# Patient Record
Sex: MALE | Race: ASIAN | NOT HISPANIC OR LATINO
[De-identification: names, ages, dates, MRNs, and addresses within clinical notes are randomized per-mention and may not be internally consistent; named-entity substitution may affect disease eponyms.]

---

## 2018-04-05 ENCOUNTER — APPOINTMENT (OUTPATIENT)
Dept: UROLOGY | Facility: CLINIC | Age: 58
End: 2018-04-05
Payer: COMMERCIAL

## 2018-04-05 VITALS
SYSTOLIC BLOOD PRESSURE: 126 MMHG | HEIGHT: 68 IN | HEART RATE: 101 BPM | TEMPERATURE: 99 F | OXYGEN SATURATION: 98 % | BODY MASS INDEX: 23.64 KG/M2 | DIASTOLIC BLOOD PRESSURE: 80 MMHG | WEIGHT: 156 LBS

## 2018-04-05 DIAGNOSIS — N50.82 SCROTAL PAIN: ICD-10-CM

## 2018-04-05 DIAGNOSIS — Z87.891 PERSONAL HISTORY OF NICOTINE DEPENDENCE: ICD-10-CM

## 2018-04-05 DIAGNOSIS — R30.0 DYSURIA: ICD-10-CM

## 2018-04-05 DIAGNOSIS — I86.1 SCROTAL VARICES: ICD-10-CM

## 2018-04-05 DIAGNOSIS — N50.3 CYST OF EPIDIDYMIS: ICD-10-CM

## 2018-04-05 DIAGNOSIS — Z78.9 OTHER SPECIFIED HEALTH STATUS: ICD-10-CM

## 2018-04-05 PROCEDURE — 99204 OFFICE O/P NEW MOD 45 MIN: CPT

## 2019-02-20 ENCOUNTER — APPOINTMENT (OUTPATIENT)
Dept: CT IMAGING | Facility: CLINIC | Age: 59
End: 2019-02-20
Payer: COMMERCIAL

## 2019-02-20 ENCOUNTER — TRANSCRIPTION ENCOUNTER (OUTPATIENT)
Age: 59
End: 2019-02-20

## 2019-02-20 ENCOUNTER — OUTPATIENT (OUTPATIENT)
Dept: OUTPATIENT SERVICES | Facility: HOSPITAL | Age: 59
LOS: 1 days | End: 2019-02-20
Payer: COMMERCIAL

## 2019-02-20 DIAGNOSIS — J32.0 CHRONIC MAXILLARY SINUSITIS: ICD-10-CM

## 2019-02-20 PROCEDURE — 70486 CT MAXILLOFACIAL W/O DYE: CPT | Mod: 26

## 2019-02-20 PROCEDURE — 70486 CT MAXILLOFACIAL W/O DYE: CPT

## 2022-02-19 ENCOUNTER — EMERGENCY (EMERGENCY)
Facility: HOSPITAL | Age: 62
LOS: 1 days | Discharge: HOME | End: 2022-02-19
Attending: STUDENT IN AN ORGANIZED HEALTH CARE EDUCATION/TRAINING PROGRAM
Payer: COMMERCIAL

## 2022-02-19 VITALS
OXYGEN SATURATION: 99 % | TEMPERATURE: 95 F | WEIGHT: 138.01 LBS | HEART RATE: 67 BPM | HEIGHT: 68 IN | RESPIRATION RATE: 20 BRPM | SYSTOLIC BLOOD PRESSURE: 92 MMHG | DIASTOLIC BLOOD PRESSURE: 62 MMHG

## 2022-02-19 VITALS
TEMPERATURE: 98 F | SYSTOLIC BLOOD PRESSURE: 105 MMHG | OXYGEN SATURATION: 97 % | RESPIRATION RATE: 19 BRPM | HEART RATE: 66 BPM | DIASTOLIC BLOOD PRESSURE: 68 MMHG

## 2022-02-19 LAB
ALBUMIN SERPL ELPH-MCNC: 4 G/DL — SIGNIFICANT CHANGE UP (ref 3.3–5)
ALP SERPL-CCNC: 78 U/L — SIGNIFICANT CHANGE UP (ref 40–120)
ALT FLD-CCNC: 11 U/L — SIGNIFICANT CHANGE UP (ref 10–45)
ANION GAP SERPL CALC-SCNC: 14 MMOL/L — SIGNIFICANT CHANGE UP (ref 5–17)
APPEARANCE UR: CLEAR — SIGNIFICANT CHANGE UP
APTT BLD: 26.6 SEC — LOW (ref 27.5–35.5)
AST SERPL-CCNC: 11 U/L — SIGNIFICANT CHANGE UP (ref 10–40)
BACTERIA # UR AUTO: 0 — SIGNIFICANT CHANGE UP
BASE EXCESS BLDV CALC-SCNC: -2.9 MMOL/L — LOW (ref -2–2)
BASE EXCESS BLDV CALC-SCNC: -4 MMOL/L — LOW (ref -2–2)
BASOPHILS # BLD AUTO: 0.06 K/UL — SIGNIFICANT CHANGE UP (ref 0–0.2)
BASOPHILS NFR BLD AUTO: 0.3 % — SIGNIFICANT CHANGE UP (ref 0–2)
BILIRUB SERPL-MCNC: 0.4 MG/DL — SIGNIFICANT CHANGE UP (ref 0.2–1.2)
BILIRUB UR-MCNC: NEGATIVE — SIGNIFICANT CHANGE UP
BLD GP AB SCN SERPL QL: NEGATIVE — SIGNIFICANT CHANGE UP
BUN SERPL-MCNC: 18 MG/DL — SIGNIFICANT CHANGE UP (ref 7–23)
CA-I SERPL-SCNC: 1.09 MMOL/L — LOW (ref 1.15–1.33)
CA-I SERPL-SCNC: 1.09 MMOL/L — LOW (ref 1.15–1.33)
CALCIUM SERPL-MCNC: 9.1 MG/DL — SIGNIFICANT CHANGE UP (ref 8.4–10.5)
CHLORIDE BLDV-SCNC: 101 MMOL/L — SIGNIFICANT CHANGE UP (ref 96–108)
CHLORIDE BLDV-SCNC: 103 MMOL/L — SIGNIFICANT CHANGE UP (ref 96–108)
CHLORIDE SERPL-SCNC: 100 MMOL/L — SIGNIFICANT CHANGE UP (ref 96–108)
CO2 BLDV-SCNC: 24 MMOL/L — SIGNIFICANT CHANGE UP (ref 22–26)
CO2 BLDV-SCNC: 25 MMOL/L — SIGNIFICANT CHANGE UP (ref 22–26)
CO2 SERPL-SCNC: 22 MMOL/L — SIGNIFICANT CHANGE UP (ref 22–31)
COLOR SPEC: SIGNIFICANT CHANGE UP
CREAT SERPL-MCNC: 0.88 MG/DL — SIGNIFICANT CHANGE UP (ref 0.5–1.3)
DIFF PNL FLD: NEGATIVE — SIGNIFICANT CHANGE UP
EOSINOPHIL # BLD AUTO: 0.17 K/UL — SIGNIFICANT CHANGE UP (ref 0–0.5)
EOSINOPHIL NFR BLD AUTO: 0.8 % — SIGNIFICANT CHANGE UP (ref 0–6)
EPI CELLS # UR: 0 /HPF — SIGNIFICANT CHANGE UP
GAS PNL BLDV: 132 MMOL/L — LOW (ref 136–145)
GAS PNL BLDV: 132 MMOL/L — LOW (ref 136–145)
GAS PNL BLDV: SIGNIFICANT CHANGE UP
GLUCOSE BLDV-MCNC: 125 MG/DL — HIGH (ref 70–99)
GLUCOSE BLDV-MCNC: 165 MG/DL — HIGH (ref 70–99)
GLUCOSE SERPL-MCNC: 170 MG/DL — HIGH (ref 70–99)
GLUCOSE UR QL: ABNORMAL
HCO3 BLDV-SCNC: 23 MMOL/L — SIGNIFICANT CHANGE UP (ref 22–29)
HCO3 BLDV-SCNC: 24 MMOL/L — SIGNIFICANT CHANGE UP (ref 22–29)
HCT VFR BLD CALC: 42.5 % — SIGNIFICANT CHANGE UP (ref 39–50)
HCT VFR BLDA CALC: 40 % — SIGNIFICANT CHANGE UP (ref 39–51)
HCT VFR BLDA CALC: 41 % — SIGNIFICANT CHANGE UP (ref 39–51)
HGB BLD CALC-MCNC: 13.3 G/DL — SIGNIFICANT CHANGE UP (ref 12.6–17.4)
HGB BLD CALC-MCNC: 13.6 G/DL — SIGNIFICANT CHANGE UP (ref 12.6–17.4)
HGB BLD-MCNC: 14.4 G/DL — SIGNIFICANT CHANGE UP (ref 13–17)
HYALINE CASTS # UR AUTO: 1 /LPF — SIGNIFICANT CHANGE UP (ref 0–2)
IMM GRANULOCYTES NFR BLD AUTO: 0.6 % — SIGNIFICANT CHANGE UP (ref 0–1.5)
INR BLD: 1.11 RATIO — SIGNIFICANT CHANGE UP (ref 0.88–1.16)
KETONES UR-MCNC: ABNORMAL
LACTATE BLDV-MCNC: 2.4 MMOL/L — HIGH (ref 0.7–2)
LACTATE BLDV-MCNC: 2.7 MMOL/L — HIGH (ref 0.7–2)
LEUKOCYTE ESTERASE UR-ACNC: NEGATIVE — SIGNIFICANT CHANGE UP
LIDOCAIN IGE QN: 41 U/L — SIGNIFICANT CHANGE UP (ref 7–60)
LYMPHOCYTES # BLD AUTO: 11.1 % — LOW (ref 13–44)
LYMPHOCYTES # BLD AUTO: 2.41 K/UL — SIGNIFICANT CHANGE UP (ref 1–3.3)
MCHC RBC-ENTMCNC: 30.8 PG — SIGNIFICANT CHANGE UP (ref 27–34)
MCHC RBC-ENTMCNC: 33.9 GM/DL — SIGNIFICANT CHANGE UP (ref 32–36)
MCV RBC AUTO: 91 FL — SIGNIFICANT CHANGE UP (ref 80–100)
MONOCYTES # BLD AUTO: 0.99 K/UL — HIGH (ref 0–0.9)
MONOCYTES NFR BLD AUTO: 4.6 % — SIGNIFICANT CHANGE UP (ref 2–14)
NEUTROPHILS # BLD AUTO: 17.88 K/UL — HIGH (ref 1.8–7.4)
NEUTROPHILS NFR BLD AUTO: 82.6 % — HIGH (ref 43–77)
NITRITE UR-MCNC: NEGATIVE — SIGNIFICANT CHANGE UP
NRBC # BLD: 0 /100 WBCS — SIGNIFICANT CHANGE UP (ref 0–0)
PCO2 BLDV: 46 MMHG — SIGNIFICANT CHANGE UP (ref 42–55)
PCO2 BLDV: 47 MMHG — SIGNIFICANT CHANGE UP (ref 42–55)
PH BLDV: 7.3 — LOW (ref 7.32–7.43)
PH BLDV: 7.31 — LOW (ref 7.32–7.43)
PH UR: 6.5 — SIGNIFICANT CHANGE UP (ref 5–8)
PLATELET # BLD AUTO: 202 K/UL — SIGNIFICANT CHANGE UP (ref 150–400)
PO2 BLDV: 38 MMHG — SIGNIFICANT CHANGE UP (ref 25–45)
PO2 BLDV: 45 MMHG — SIGNIFICANT CHANGE UP (ref 25–45)
POTASSIUM BLDV-SCNC: 5.1 MMOL/L — SIGNIFICANT CHANGE UP (ref 3.5–5.1)
POTASSIUM BLDV-SCNC: 6.3 MMOL/L — CRITICAL HIGH (ref 3.5–5.1)
POTASSIUM SERPL-MCNC: 3.3 MMOL/L — LOW (ref 3.5–5.3)
POTASSIUM SERPL-SCNC: 3.3 MMOL/L — LOW (ref 3.5–5.3)
PROT SERPL-MCNC: 6.5 G/DL — SIGNIFICANT CHANGE UP (ref 6–8.3)
PROT UR-MCNC: ABNORMAL
PROTHROM AB SERPL-ACNC: 13.3 SEC — SIGNIFICANT CHANGE UP (ref 10.6–13.6)
RBC # BLD: 4.67 M/UL — SIGNIFICANT CHANGE UP (ref 4.2–5.8)
RBC # FLD: 12 % — SIGNIFICANT CHANGE UP (ref 10.3–14.5)
RBC CASTS # UR COMP ASSIST: 2 /HPF — SIGNIFICANT CHANGE UP (ref 0–4)
RH IG SCN BLD-IMP: POSITIVE — SIGNIFICANT CHANGE UP
SAO2 % BLDV: 66.1 % — LOW (ref 67–88)
SAO2 % BLDV: 74 % — SIGNIFICANT CHANGE UP (ref 67–88)
SARS-COV-2 RNA SPEC QL NAA+PROBE: SIGNIFICANT CHANGE UP
SODIUM SERPL-SCNC: 136 MMOL/L — SIGNIFICANT CHANGE UP (ref 135–145)
SP GR SPEC: >1.05 (ref 1.01–1.02)
TROPONIN T, HIGH SENSITIVITY RESULT: <6 NG/L — SIGNIFICANT CHANGE UP (ref 0–51)
UROBILINOGEN FLD QL: NEGATIVE — SIGNIFICANT CHANGE UP
WBC # BLD: 21.65 K/UL — HIGH (ref 3.8–10.5)
WBC # FLD AUTO: 21.65 K/UL — HIGH (ref 3.8–10.5)
WBC UR QL: 0 /HPF — SIGNIFICANT CHANGE UP (ref 0–5)

## 2022-02-19 PROCEDURE — 83690 ASSAY OF LIPASE: CPT

## 2022-02-19 PROCEDURE — 86900 BLOOD TYPING SEROLOGIC ABO: CPT

## 2022-02-19 PROCEDURE — 87635 SARS-COV-2 COVID-19 AMP PRB: CPT

## 2022-02-19 PROCEDURE — 84295 ASSAY OF SERUM SODIUM: CPT

## 2022-02-19 PROCEDURE — 99291 CRITICAL CARE FIRST HOUR: CPT

## 2022-02-19 PROCEDURE — 85025 COMPLETE CBC W/AUTO DIFF WBC: CPT

## 2022-02-19 PROCEDURE — 96375 TX/PRO/DX INJ NEW DRUG ADDON: CPT

## 2022-02-19 PROCEDURE — 71045 X-RAY EXAM CHEST 1 VIEW: CPT | Mod: 26

## 2022-02-19 PROCEDURE — 71045 X-RAY EXAM CHEST 1 VIEW: CPT

## 2022-02-19 PROCEDURE — 96374 THER/PROPH/DIAG INJ IV PUSH: CPT

## 2022-02-19 PROCEDURE — 85018 HEMOGLOBIN: CPT

## 2022-02-19 PROCEDURE — 74177 CT ABD & PELVIS W/CONTRAST: CPT | Mod: 26,MA

## 2022-02-19 PROCEDURE — 82803 BLOOD GASES ANY COMBINATION: CPT

## 2022-02-19 PROCEDURE — 80053 COMPREHEN METABOLIC PANEL: CPT

## 2022-02-19 PROCEDURE — 82330 ASSAY OF CALCIUM: CPT

## 2022-02-19 PROCEDURE — 82565 ASSAY OF CREATININE: CPT

## 2022-02-19 PROCEDURE — 74177 CT ABD & PELVIS W/CONTRAST: CPT | Mod: MA

## 2022-02-19 PROCEDURE — 87086 URINE CULTURE/COLONY COUNT: CPT

## 2022-02-19 PROCEDURE — 86901 BLOOD TYPING SEROLOGIC RH(D): CPT

## 2022-02-19 PROCEDURE — 93010 ELECTROCARDIOGRAM REPORT: CPT

## 2022-02-19 PROCEDURE — 84484 ASSAY OF TROPONIN QUANT: CPT

## 2022-02-19 PROCEDURE — 82435 ASSAY OF BLOOD CHLORIDE: CPT

## 2022-02-19 PROCEDURE — 83605 ASSAY OF LACTIC ACID: CPT

## 2022-02-19 PROCEDURE — 82947 ASSAY GLUCOSE BLOOD QUANT: CPT

## 2022-02-19 PROCEDURE — 86850 RBC ANTIBODY SCREEN: CPT

## 2022-02-19 PROCEDURE — 84132 ASSAY OF SERUM POTASSIUM: CPT

## 2022-02-19 PROCEDURE — 81001 URINALYSIS AUTO W/SCOPE: CPT

## 2022-02-19 PROCEDURE — 85730 THROMBOPLASTIN TIME PARTIAL: CPT

## 2022-02-19 PROCEDURE — 85610 PROTHROMBIN TIME: CPT

## 2022-02-19 PROCEDURE — 99291 CRITICAL CARE FIRST HOUR: CPT | Mod: 25

## 2022-02-19 PROCEDURE — 36415 COLL VENOUS BLD VENIPUNCTURE: CPT

## 2022-02-19 PROCEDURE — 85014 HEMATOCRIT: CPT

## 2022-02-19 PROCEDURE — 96376 TX/PRO/DX INJ SAME DRUG ADON: CPT

## 2022-02-19 RX ORDER — CEFTRIAXONE 500 MG/1
1000 INJECTION, POWDER, FOR SOLUTION INTRAMUSCULAR; INTRAVENOUS ONCE
Refills: 0 | Status: COMPLETED | OUTPATIENT
Start: 2022-02-19 | End: 2022-02-19

## 2022-02-19 RX ORDER — PANTOPRAZOLE SODIUM 20 MG/1
80 TABLET, DELAYED RELEASE ORAL ONCE
Refills: 0 | Status: COMPLETED | OUTPATIENT
Start: 2022-02-19 | End: 2022-02-19

## 2022-02-19 RX ORDER — SODIUM CHLORIDE 9 MG/ML
1000 INJECTION INTRAMUSCULAR; INTRAVENOUS; SUBCUTANEOUS ONCE
Refills: 0 | Status: COMPLETED | OUTPATIENT
Start: 2022-02-19 | End: 2022-02-19

## 2022-02-19 RX ORDER — MORPHINE SULFATE 50 MG/1
4 CAPSULE, EXTENDED RELEASE ORAL ONCE
Refills: 0 | Status: DISCONTINUED | OUTPATIENT
Start: 2022-02-19 | End: 2022-02-19

## 2022-02-19 RX ADMIN — CEFTRIAXONE 100 MILLIGRAM(S): 500 INJECTION, POWDER, FOR SOLUTION INTRAMUSCULAR; INTRAVENOUS at 05:39

## 2022-02-19 RX ADMIN — MORPHINE SULFATE 4 MILLIGRAM(S): 50 CAPSULE, EXTENDED RELEASE ORAL at 02:50

## 2022-02-19 RX ADMIN — PANTOPRAZOLE SODIUM 80 MILLIGRAM(S): 20 TABLET, DELAYED RELEASE ORAL at 03:04

## 2022-02-19 RX ADMIN — SODIUM CHLORIDE 1000 MILLILITER(S): 9 INJECTION INTRAMUSCULAR; INTRAVENOUS; SUBCUTANEOUS at 04:06

## 2022-02-19 RX ADMIN — MORPHINE SULFATE 4 MILLIGRAM(S): 50 CAPSULE, EXTENDED RELEASE ORAL at 06:19

## 2022-02-19 RX ADMIN — SODIUM CHLORIDE 1000 MILLILITER(S): 9 INJECTION INTRAMUSCULAR; INTRAVENOUS; SUBCUTANEOUS at 02:39

## 2022-02-19 NOTE — ED PROVIDER NOTE - PATIENT PORTAL LINK FT
You can access the FollowMyHealth Patient Portal offered by [facility] by registering at the following website: [email or web address]. By joining [employer]’s FollowMyHealth portal, you will also be able to view your health information using other applications (apps) compatible with our system.

## 2022-02-19 NOTE — ED PROVIDER NOTE - NSFOLLOWUPINSTRUCTIONS_ED_ALL_ED_FT
Thank you for visiting our Emergency Department, it has been a pleasure taking part in your healthcare.    You had a thorough evaluation including an exam, labs and imaging. You were given medications for comfort. Your workup did not demonstrate any concerning findings. This does not mean that your symptoms are not real, only that we were unable to find a dangerous or life-threatening cause. Please read the attached information sheets as they will provide useful information regarding your condition.    Your discharge diagnosis is: gastritis  Return precautions to the Emergency Department include but are not limited to: worsening pain despite medications, persistent nausea/vomiting (can't keep water down at all) fevers, unrelenting nausea, vomiting, shortness of breath, dizziness, chest or abdominal pain, worsening back pain, syncope, blood in urine, stool, or vomit, headache that doesn't resolve, numbness or tingling, loss of sensation, loss of motor function, or any other concerning symptoms.    1) Follow up with your primary care doctor within 48 hours. Please call [phone] to make an appointment or with any questions you may have.  or call [phone] to make an appointment with the clinic  2) You should also establish care with Gastroenterology by calling  [phone] to find a doctor affiliated with [facility] in your neighborhood & network.   3) Take over the counter Pepcid 20 mg every 12 hrs. Take maalox 2 tablespoons (30ml) as needed for epigastric discomfort. Take simethicone (GasX) every 6 hours as needed for gas pain, cramping, burping or flatulance.  4) Drink at least 2 Liters or 64 Ounces of water each day.  5) Read all attached.

## 2022-02-19 NOTE — ED PROVIDER NOTE - PROGRESS NOTE DETAILS
Kamini Nguyen PGY-2: WBC 21. Troponin negative. CT with no sign of perforated ulcer. " Indeterminate hyperenhancing lesions in the liver possibly representing hemangiomas. A nonemergent contrast-enhanced MR liver protocol is recommended for further evaluation." Will explain results to patient and need for follow up. Repeat VBG pending. Attending/MD Angelo. negative ct acute pathology, pt feels better, pain 2/10, PO challenge succeeded, gastritis. +/- ua for uti. I have given the pt strict return and follow up precautions. The patient has been provided with a copy of all pertinent results. The patient has been informed of all concerning signs and symptoms to return to Emergency Department, the necessity to follow up with PMD/Clinic/follow up provided within 2-3 days was explained, and the patient reports understanding of above with capacity and insight.

## 2022-02-19 NOTE — ED ADULT NURSE REASSESSMENT NOTE - NS ED NURSE REASSESS COMMENT FT1
VSS. Pt c/o abdominal pain at this time. MD Nguyen aware, plan is to administer 4mg IVP morphine. Pending urine sample, pt aware. Will continue to monitor.

## 2022-02-19 NOTE — ED PROVIDER NOTE - ATTENDING CONTRIBUTION TO CARE
I have personally seen and examined this patient.  I have fully participated in the care of this patient. I performed a substantive portion of the visit including all aspects of the medical decision making. I have reviewed all pertinent clinical information, including history, physical exam, plan and the Resident’s note and agree except as noted. - MD Angelo.    60 yo M, no PMH, p/w sudden onset of epigastric pain, reportedly coffee ground emesis, had shoulder sugery 2 wks ago, concerning for /DU perf vs. penetration, bedside u/s without AAA enlargement, good view without flapping, low suspicion for AAA. xray without midsternal widening, bur otherwise no obvious free air, ct scan to eval for acute abd, dispo accordingly.

## 2022-02-19 NOTE — ED PROVIDER NOTE - OBJECTIVE STATEMENT
61 year old M with no reported PMH presenting with acute onset epigastric pain. Pain began around 10pm. Woke from sleep. Sharp, radiating to upper back. Had one episode of bright red emesis while waiting in ED. Thinks he might have had gross blood in stool this morning. Had a colonoscopy years ago which was negative. No cardiac history. Denies SOB, cough, fevers, chills, LE edema, syncope, CP. Daily drinker, 1-2 hard selzers. Has not had an upper endoscopy.

## 2022-02-19 NOTE — ED ADULT NURSE NOTE - OBJECTIVE STATEMENT
62 y/o male presents with c/o sudden onset of abdominal pain around 2200 last night. Pt states the pain starts in the epigastrium region and radiates to the upper back. Pt unable to describe the pain or relieving/aggravating factors. Associated sx include nausea and vomiting. Pt reports 1 emesis episode and 1 episode of bloody stool at home. Pt had 1 emesis episode in ED that he reports had a large amount of bright red blood. He denies fever, chills, diarrhea, CP, SOB, dizziness, HA. Pt denies any PMH but reports that he drinks ~1-2 hard seltzer drinks per day. Pt is tearful and anxious. A&Ox4, spontaneous breathing and equal chest rise. VSS on RA.

## 2022-02-19 NOTE — ED PROVIDER NOTE - CLINICAL SUMMARY MEDICAL DECISION MAKING FREE TEXT BOX
61 year old M with no reported PMH presenting with acute onset epigastric pain. 61 year old M with no reported PMH presenting with acute onset epigastric pain with gross hemoptysis. Patient hypotensive 105/66, but not tachycardic or febrile. Uncomfortable. No epigastric tenderness. POCUS with no evidence of aortic dissection. Suspicion for perforated gastric ulcer vs cholecystitis. Low suspicion for ruptured AAA or ascending aortic dissection. EKG with no ischemia. Will get labs, including ACS labs, fluids, coags, type and screen. CT A/P. Treat pain. Reassess.

## 2022-02-19 NOTE — ED PROVIDER NOTE - NS ED ROS FT
CONST: no fevers, no chills  EYES: no pain, no vision changes  ENT: no sore throat, no ear pain, no change in hearing  CV: no chest pain, no leg swelling  RESP: no shortness of breath, no cough  ABD: + abdominal pain, no nausea, + vomiting, no diarrhea, + bloody stools  : no dysuria, no flank pain, no hematuria  MSK: no back pain, no extremity pain  NEURO: no headache or additional neurologic complaints  HEME: no easy bleeding  SKIN:  no rash

## 2022-02-19 NOTE — ED PROVIDER NOTE - PHYSICAL EXAMINATION
GENERAL: Awake, alert, in pain  HEENT: NC/AT, moist mucous membranes, PERRL, EOMI  LUNGS: CTAB, no wheezes or crackles   CARDIAC: RRR, no m/r/g  ABDOMEN: Soft, normal BS, non tender, non distended, no rebound, no guarding  BACK: No midline spinal tenderness, no CVA tenderness  EXT: No edema, no calf tenderness, 2+ DP pulses bilaterally, no deformities.  NEURO: A&Ox3. Moving all extremities.  SKIN: Warm and dry. No rash.  PSYCH: Normal affect.

## 2022-02-20 LAB
CULTURE RESULTS: NO GROWTH — SIGNIFICANT CHANGE UP
SPECIMEN SOURCE: SIGNIFICANT CHANGE UP

## 2023-01-26 ENCOUNTER — EMERGENCY (EMERGENCY)
Facility: HOSPITAL | Age: 63
LOS: 1 days | Discharge: HOME | End: 2023-01-26
Attending: EMERGENCY MEDICINE
Payer: COMMERCIAL

## 2023-01-26 VITALS
WEIGHT: 143.08 LBS | TEMPERATURE: 98 F | RESPIRATION RATE: 20 BRPM | HEIGHT: 68 IN | HEART RATE: 87 BPM | SYSTOLIC BLOOD PRESSURE: 144 MMHG | OXYGEN SATURATION: 98 % | DIASTOLIC BLOOD PRESSURE: 90 MMHG

## 2023-01-26 VITALS
TEMPERATURE: 98 F | SYSTOLIC BLOOD PRESSURE: 137 MMHG | HEART RATE: 81 BPM | OXYGEN SATURATION: 98 % | DIASTOLIC BLOOD PRESSURE: 88 MMHG | RESPIRATION RATE: 20 BRPM

## 2023-01-26 PROCEDURE — 99283 EMERGENCY DEPT VISIT LOW MDM: CPT

## 2023-01-26 PROCEDURE — 96372 THER/PROPH/DIAG INJ SC/IM: CPT

## 2023-01-26 PROCEDURE — 99284 EMERGENCY DEPT VISIT MOD MDM: CPT

## 2023-01-26 RX ORDER — ACETAMINOPHEN 500 MG
975 TABLET ORAL ONCE
Refills: 0 | Status: COMPLETED | OUTPATIENT
Start: 2023-01-26 | End: 2023-01-26

## 2023-01-26 RX ORDER — METHOCARBAMOL 500 MG/1
4 TABLET, FILM COATED ORAL
Qty: 60 | Refills: 0
Start: 2023-01-26 | End: 2023-01-30

## 2023-01-26 RX ORDER — KETOROLAC TROMETHAMINE 30 MG/ML
15 SYRINGE (ML) INJECTION ONCE
Refills: 0 | Status: DISCONTINUED | OUTPATIENT
Start: 2023-01-26 | End: 2023-01-26

## 2023-01-26 RX ORDER — LIDOCAINE 4 G/100G
1 CREAM TOPICAL ONCE
Refills: 0 | Status: COMPLETED | OUTPATIENT
Start: 2023-01-26 | End: 2023-01-26

## 2023-01-26 RX ORDER — METHOCARBAMOL 500 MG/1
1500 TABLET, FILM COATED ORAL ONCE
Refills: 0 | Status: COMPLETED | OUTPATIENT
Start: 2023-01-26 | End: 2023-01-26

## 2023-01-26 RX ADMIN — LIDOCAINE 1 PATCH: 4 CREAM TOPICAL at 18:15

## 2023-01-26 RX ADMIN — METHOCARBAMOL 1500 MILLIGRAM(S): 500 TABLET, FILM COATED ORAL at 19:32

## 2023-01-26 RX ADMIN — Medication 15 MILLIGRAM(S): at 18:16

## 2023-01-26 RX ADMIN — Medication 975 MILLIGRAM(S): at 18:16

## 2023-01-26 NOTE — ED PROVIDER NOTE - PROGRESS NOTE DETAILS
Attending MD Rios: Feels improved after Robaxin, here with family.  Ambulating with steady gait. Follow up instructions given, importance of follow up emphasized, return to ED parameters reviewed and patient verbalized understanding.  All questions answered, all concerns addressed. Rx Robaxin to pharmacy.

## 2023-01-26 NOTE — ED PROVIDER NOTE - NSFOLLOWUPINSTRUCTIONS_ED_ALL_ED_FT
-   You came to the emergency room for back pain, your back pain seems like your back pain in the past.  We treated your pain with good results.  You should follow-up with your orthopedic surgeon and your PCP.  try heat pads, cold packs.  reasons to come back to the emergency room include but are not limited to numbness where you wipe, urinary incontinence.    - Your testing/exams was/were reassuring that dangerous emergencies/conditions are less likely to be occurring or to have occurred.    - Take all medications as directed.    - For pain or fever you can take ibuprofen (motrin, advil) or acetaminophen (tylenol) as needed, as directed on packaging.  - Follow up with your primary doctor within 5 days as directed.  - If you had labs or imaging done, you were given copies of all labs and/or imaging results from your er visit--please take them with you to your follow up appointments.  - If needed, call patient access services at [phone] to find a primary care physician (PCP). Call this number to follow up with a specialty service, such as the spine clinic. If you need this, call and say you were recently in the emergency department and you are calling, per my orders.   - Make sure you do not require a primary care physician's referral if you make a specialty clinic appointment directly. Some insurance requires you to see your PCP, get a referral, then make a specialty appointment.     - Return to the emergency department for any worsening symptoms or concerns. -   You came to the emergency room for back pain, your back pain seems like your back pain in the past.  We treated your pain with good results.  You should follow-up with your orthopedic surgeon and your PCP.  try heat pads, cold packs.  reasons to come back to the emergency room include but are not limited to numbness where you wipe, urinary incontinence.    FOLLOW UP WITH YOUR ORTHOPEDIST, THAT YOU PREVIOUSLY SAW FOR YOUR BACK.    - Your testing/exams was/were reassuring that dangerous emergencies/conditions are less likely to be occurring or to have occurred.    - Take all medications as directed.    - For pain or fever you can take ibuprofen (motrin, advil) or acetaminophen (tylenol) as needed, as directed on packaging.  - Follow up with your primary doctor within 5 days as directed.  - If you had labs or imaging done, you were given copies of all labs and/or imaging results from your er visit--please take them with you to your follow up appointments.  - If needed, call patient access services at [phone] to find a primary care physician (PCP). Call this number to follow up with a specialty service, such as the spine clinic. If you need this, call and say you were recently in the emergency department and you are calling, per my orders.   - Make sure you do not require a primary care physician's referral if you make a specialty clinic appointment directly. Some insurance requires you to see your PCP, get a referral, then make a specialty appointment.     - Return to the emergency department for any worsening symptoms or concerns.

## 2023-01-26 NOTE — ED PROVIDER NOTE - PATIENT PORTAL LINK FT
You can access the FollowMyHealth Patient Portal offered by [facility] by registering at the following website: [email or web address]. By joining [employer]’s FollowMyHealth portal, you will also be able to view your health information using other applications (apps) compatible with our system. You can access the FollowMyHealth Patient Portal offered by [facility] by registering at the following website: [email or web address]. By joining [employer]’s FollowMyHealth portal, you will also be able to view your health information using other applications (apps) compatible with our system.

## 2023-01-26 NOTE — ED PROVIDER NOTE - OBJECTIVE STATEMENT
62-year-old male with no past medical history no surgical history presenting with back pain.  2 days ago, patient was picking up a bag from his trunk.  Felt pain in his back.  This pain radiated through the posterior aspect of his gluteus down and lateral terminating no further than knee.

## 2023-01-26 NOTE — ED PROVIDER NOTE - NS ED ROS FT
Constitutional:  (-) fever  ENMT:(-) neck pain or stiffness  Cardiac: (-) chest pain   Respiratory:  (-) cough  GI:  (-) nausea (-) vomiting (-) diarrhea   Neuro:  (-) headache (-) numbness (-) focal weakness  Skin:  (-) rash  Except as documented in the HPI,  all other systems are negative

## 2023-01-26 NOTE — ED PROVIDER NOTE - PHYSICAL EXAMINATION
CONSTITUTIONAL: well-appearing, in NAD  SKIN: Warm dry, no rashes on posterior back or gluteus max.   HEAD: NCAT  EYES: no scleral icterus, conjunctiva pink  NECK: Supple; non tender. Full ROM.  CARD: RRR, no murmurs.  RESP: clear to ausculation b/l. No crackles or wheezing.  ABD: soft, non-tender, non-distended, no rebound or guarding.  MSK: paraspinal ttp. no pedal edema, no calf tenderness. neg straight leg b/l.   NEURO: Sensation grossly intact in UE and LE b/l. Patellar reflexes normal b/l.   PSYCH: Cooperative, appropriate.

## 2023-01-26 NOTE — ED PROVIDER NOTE - CLINICAL SUMMARY MEDICAL DECISION MAKING FREE TEXT BOX
62-year-old male with back pain, has an orthopedic surgeon and PCP, has had back pain in the past similar.  2 days ago with exacerbation of pain radiating down the left leg.  No red flags.  Differential includes but is not limited to herniation, possible sciatica, extremely low likelihood for conus medullaris or cauda equina.  Will give pain medication and reevaluate.  Likely dispo home.
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
## 2023-01-26 NOTE — ED ADULT NURSE NOTE - OBJECTIVE STATEMENT
61 y/o M no PMHx c/c lower back pain x2 days after lifting a suitcase from his trunk. Pt reported 7/10 pain. Last took Motrin 6 hours PTA. Pt stated that pain meds did not relieve pain. Denies cp/sob/n/v/d/dizziness. VSS, Safety precautions maintained.

## 2023-01-27 RX ORDER — METHOCARBAMOL 500 MG/1
2 TABLET, FILM COATED ORAL
Qty: 30 | Refills: 0
Start: 2023-01-27 | End: 2023-01-31

## 2023-01-27 NOTE — ED POST DISCHARGE NOTE - ADDITIONAL DOCUMENTATION
pema plummer- received call from pharmacy, concern for dosage of robaxin. prescribed at 750mg 4 tablets 3x/day. changed to usual dosing of 2 tabs 3x/day as needed for muscle spasm. I did not treat/care for this patient, I simply adjust the already prescribed medication that was intended by the physicians who cared for this patient.

## 2023-10-11 NOTE — ED ADULT NURSE NOTE - CHPI ED NUR SYMPTOMS POS
----- Message from Mercedez Preston sent at 10/11/2023  3:15 PM CDT -----  Que with [facility] is calling to see if she can get a order for a ua and culture. He has cloudy urine, odorless, fever of 100.4, fatigue   [phone]     PAIN

## 2024-02-12 NOTE — ED ADULT TRIAGE NOTE - WEIGHT IN KG
PHYSICAL / OCCUPATIONAL THERAPY - DAILY TREATMENT NOTE    Patient Name: Pat Jeffries    Date: 2024    : 1966  Insurance: Payor: JULIANNE / Plan: JULIANNE BOND HMO / Product Type: *No Product type* /      Patient  verified Yes     Visit #   Current / Total 2 16   Time   In / Out 2:20 3:03   Pain   In / Out 7/10 5/10   Subjective Functional Status/Changes: Reports indep with HEP, walking/standing > 3 min is painful/difficult     TREATMENT AREA =  Other low back pain [M54.59]    OBJECTIVE     Estim Unattended (UNTIMED), with HEAT type/location: prone LS     Min Rationale   10 decrease pain and increase tissue extensibility to improve patient's ability to progress to PLOF and address remaining functional goals.     Skin assessment post-treatment:   Intact     Therapeutic Procedures:    66273 Therapeutic Exercise (timed):  increase ROM, strength, coordination, balance, and proprioception to improve patient's ability to progress to PLOF and address remaining functional goals.   Tx Min Billable or 1:1 Min   (if diff from Tx Min) Details:   13  See flow sheet as applicable     81716 Neuromuscular Re-Education (timed):  improve balance, coordination, kinesthetic sense, posture, core stability and proprioception to improve patient's ability to develop conscious control of individual muscles and awareness of position of extremities in order to progress to PLOF and address remaining functional goals.   Tx Min Billable or 1:1 Min   (if diff from Tx Min) Details:   10  See flow sheet as applicable     40958 Therapeutic Activity (timed):  use of dynamic activities replicating functional movements to increase ROM, strength, coordination, balance, and proprioception in order to improve patient's ability to progress to PLOF and address remaining functional goals.   Tx Min Billable or 1:1 Min   (if diff from Tx Min) Details:   10  See flow sheet as applicable          BC Totals Reminder: bill using total  64.9

## 2025-06-13 NOTE — ED PROVIDER NOTE - CONDITION AT DISCHARGE:
Called [facility] and gave a brief report to Marly and also let them know that he was on his way to [facility]. I also spoke to Tesha at [facility] and gave a brief report.   Improved
